# Patient Record
Sex: MALE | Race: WHITE
[De-identification: names, ages, dates, MRNs, and addresses within clinical notes are randomized per-mention and may not be internally consistent; named-entity substitution may affect disease eponyms.]

---

## 2019-07-07 ENCOUNTER — HOSPITAL ENCOUNTER (EMERGENCY)
Dept: HOSPITAL 94 - ER | Age: 64
Discharge: HOME | End: 2019-07-07
Payer: MEDICARE

## 2019-07-07 VITALS — SYSTOLIC BLOOD PRESSURE: 132 MMHG | DIASTOLIC BLOOD PRESSURE: 88 MMHG

## 2019-07-07 VITALS — WEIGHT: 210.43 LBS | BODY MASS INDEX: 31.17 KG/M2 | HEIGHT: 69 IN

## 2019-07-07 DIAGNOSIS — R04.0: Primary | ICD-10-CM

## 2019-07-07 PROCEDURE — 99283 EMERGENCY DEPT VISIT LOW MDM: CPT

## 2019-07-07 PROCEDURE — 96374 THER/PROPH/DIAG INJ IV PUSH: CPT

## 2023-10-15 ENCOUNTER — HOSPITAL ENCOUNTER (EMERGENCY)
Dept: HOSPITAL 94 - ER | Age: 68
End: 2023-10-15
Payer: MEDICARE

## 2023-10-15 VITALS — HEIGHT: 70 IN | WEIGHT: 220.46 LBS | BODY MASS INDEX: 31.56 KG/M2

## 2023-10-15 DIAGNOSIS — I46.9: Primary | ICD-10-CM

## 2023-10-15 PROCEDURE — 92950 HEART/LUNG RESUSCITATION CPR: CPT

## 2023-10-15 PROCEDURE — 99285 EMERGENCY DEPT VISIT HI MDM: CPT

## 2023-10-15 PROCEDURE — 31500 INSERT EMERGENCY AIRWAY: CPT

## 2023-10-15 PROCEDURE — 94760 N-INVAS EAR/PLS OXIMETRY 1: CPT

## 2023-10-15 PROCEDURE — 82948 REAGENT STRIP/BLOOD GLUCOSE: CPT

## 2023-10-15 NOTE — NUR
PT ARRIVED BY EMS WITH CPR IN PROGRESS AT 1507. PT CALLED EMS D/T INCREASED 
SOB. IN ROUTE AT 1500 PT ARRESTED AND EMS STARTED CPR. UPON ARRIVAL THE PT DID 
NOT HAVE A PULSE. DR ALEMAN AT BEDSIDE AND SUCCESSFULLY INTUBATED PT. EMS 
OBTAINED IO IV TO RLE. PT RECD TOTAL OF THREE DOSES OF EPI, ONE DOSE OF 
ATROPINE, ONE DOSE OF CALCIUM, AND 1 L BOLUS OF NS. MULTIPLE ULTRASOUNDS OF 
CARDIAC FUNCTION WERE PERFORMED BY DR ALEMAN WITH NO RESULTING FUNCTION SHOWN. 
CODE WAS CALLED AT 1329 AND CPR STOPPED. PT LIVES ALONE PER EMS. RN WILL 
ATTEMPT TO REACH FAMILY. SEE CODE BLUE DOCUMENTATION FORM RECORDED BY NORI TALLEY 
FOR ANY ADDITIONAL INFO.

## 2023-10-15 NOTE — NUR
PER CHERRY CHARGE RN ROBERT MARCIAL HAS TO BE CONTACTED TO REACH OUT TO [facility] 
OFFICE. THIS RN CALLED ROBERT MARCIAL [phone] AND SPOKE WITH TONY AND SHE WILL 
HAVE [facility] CALL ED BACK. [facility]Y NOTIFIED THEY WERE CONTACTED.

## 2023-10-15 NOTE — NUR
CORRECTION TO NOTE MADE AT 1530. AT 1730 PT BROTHER SAI MALDONADO CAME TO GET PT 
BELONGINGS. NORI TALLEY CHARGE PROVIDED BELONGINGS TO SAI.

## 2023-10-15 NOTE — NUR
-------------------------------------------------------------------------------

           *** Note undone in EDM - 10/15/23 at 1933 by ADE ***            

-------------------------------------------------------------------------------

PT BROTHER SAI MALDONADO CAME TO GET PT BELONGINGS. CHARGE MAYANK JULIO PROVIDED 
BELONGINGS TO SAI.

## 2023-10-15 NOTE — NUR
RETURNED CALL FROM TETO OF 'S OFFICE. PT TO BE PICKED UP BY 
[facility]  [place] PER FAMILY REQUEST

 [facility] [place], [phone], NOTIFIED OF PT 
RELEASED AND NEED TO BE PICKED UP

## 2023-10-15 NOTE — NUR
[address] OF [facility] CALLED TO CONFIRM PREVIOUS CALL 
MADE TO THEM REGARDING DEATH OF PATIENT

1949 CALL MADE TO [facility] FOR  NOTIFICATION OF DEATH IN ER, 
MESSAGE GIVEN TO DISPATCHER ID # [id number]

## 2023-10-15 NOTE — NUR
RN OBTAINED PATIENT BROTHER SAI MALDONADO PHONE NUMBER [phone] FROM PT 
PHONE. RN CALLED AND NOTIFIED SAI THAT ZAKIYA WAS . PER SAI PT 
DOES NOT HAVE ANY OTHER FAMILY AND [facility]  [facility] IN [place] IS WHO 
NEEDS TO BE CONTACTED. RN WILL NOTIFY NORI TALLEY CHARGE.

## 2023-10-15 NOTE — NUR
PER OFELIA AT [employer] THEY ARE GOING TO RELEASE PT SO PT IS NOT A 
CANDIDATE TO BE A DONOR. ALSO,  HAS NOT RETURNED THE CALL. ROSA TALLEY 
WILL ATTEMPT TO CALL THEM BACK AND LEAVE A SECOND MESSAGE.